# Patient Record
Sex: FEMALE | Race: WHITE | NOT HISPANIC OR LATINO | URBAN - METROPOLITAN AREA
[De-identification: names, ages, dates, MRNs, and addresses within clinical notes are randomized per-mention and may not be internally consistent; named-entity substitution may affect disease eponyms.]

---

## 2017-08-21 ENCOUNTER — OUTPATIENT (OUTPATIENT)
Dept: OUTPATIENT SERVICES | Facility: HOSPITAL | Age: 52
LOS: 1 days | End: 2017-08-21
Payer: COMMERCIAL

## 2017-08-21 ENCOUNTER — TRANSCRIPTION ENCOUNTER (OUTPATIENT)
Age: 52
End: 2017-08-21

## 2017-08-21 DIAGNOSIS — Z12.11 ENCOUNTER FOR SCREENING FOR MALIGNANT NEOPLASM OF COLON: ICD-10-CM

## 2017-08-21 PROCEDURE — G0121: CPT

## 2018-10-01 ENCOUNTER — EMERGENCY (EMERGENCY)
Facility: HOSPITAL | Age: 53
LOS: 1 days | Discharge: HOME | End: 2018-10-01
Attending: STUDENT IN AN ORGANIZED HEALTH CARE EDUCATION/TRAINING PROGRAM
Payer: COMMERCIAL

## 2018-10-01 VITALS — HEIGHT: 61 IN | WEIGHT: 149.91 LBS

## 2018-10-01 DIAGNOSIS — Z98.891 HISTORY OF UTERINE SCAR FROM PREVIOUS SURGERY: Chronic | ICD-10-CM

## 2018-10-01 LAB
ALBUMIN SERPL ELPH-MCNC: 4.4 G/DL — SIGNIFICANT CHANGE UP (ref 3.3–5.2)
ALP SERPL-CCNC: 49 U/L — SIGNIFICANT CHANGE UP (ref 40–120)
ALT FLD-CCNC: 22 U/L — SIGNIFICANT CHANGE UP
ANION GAP SERPL CALC-SCNC: 14 MMOL/L — SIGNIFICANT CHANGE UP (ref 5–17)
AST SERPL-CCNC: 25 U/L — SIGNIFICANT CHANGE UP
BILIRUB SERPL-MCNC: 0.6 MG/DL — SIGNIFICANT CHANGE UP (ref 0.4–2)
BUN SERPL-MCNC: 18 MG/DL — SIGNIFICANT CHANGE UP (ref 8–20)
CALCIUM SERPL-MCNC: 9.6 MG/DL — SIGNIFICANT CHANGE UP (ref 8.6–10.2)
CHLORIDE SERPL-SCNC: 103 MMOL/L — SIGNIFICANT CHANGE UP (ref 98–107)
CO2 SERPL-SCNC: 23 MMOL/L — SIGNIFICANT CHANGE UP (ref 22–29)
CREAT SERPL-MCNC: 0.74 MG/DL — SIGNIFICANT CHANGE UP (ref 0.5–1.3)
GLUCOSE SERPL-MCNC: 95 MG/DL — SIGNIFICANT CHANGE UP (ref 70–115)
HCT VFR BLD CALC: 42.2 % — SIGNIFICANT CHANGE UP (ref 37–47)
HGB BLD-MCNC: 13.4 G/DL — SIGNIFICANT CHANGE UP (ref 12–16)
MAGNESIUM SERPL-MCNC: 2.1 MG/DL — SIGNIFICANT CHANGE UP (ref 1.6–2.6)
MCHC RBC-ENTMCNC: 28.2 PG — SIGNIFICANT CHANGE UP (ref 27–31)
MCHC RBC-ENTMCNC: 31.8 G/DL — LOW (ref 32–36)
MCV RBC AUTO: 88.8 FL — SIGNIFICANT CHANGE UP (ref 81–99)
NT-PROBNP SERPL-SCNC: 16 PG/ML — SIGNIFICANT CHANGE UP (ref 0–300)
PLATELET # BLD AUTO: 267 K/UL — SIGNIFICANT CHANGE UP (ref 150–400)
POTASSIUM SERPL-MCNC: 3.9 MMOL/L — SIGNIFICANT CHANGE UP (ref 3.5–5.3)
POTASSIUM SERPL-SCNC: 3.9 MMOL/L — SIGNIFICANT CHANGE UP (ref 3.5–5.3)
PROT SERPL-MCNC: 7.5 G/DL — SIGNIFICANT CHANGE UP (ref 6.6–8.7)
RBC # BLD: 4.75 M/UL — SIGNIFICANT CHANGE UP (ref 4.4–5.2)
RBC # FLD: 13 % — SIGNIFICANT CHANGE UP (ref 11–15.6)
SODIUM SERPL-SCNC: 140 MMOL/L — SIGNIFICANT CHANGE UP (ref 135–145)
TROPONIN T SERPL-MCNC: <0.01 NG/ML — SIGNIFICANT CHANGE UP (ref 0–0.06)
WBC # BLD: 7.9 K/UL — SIGNIFICANT CHANGE UP (ref 4.8–10.8)
WBC # FLD AUTO: 7.9 K/UL — SIGNIFICANT CHANGE UP (ref 4.8–10.8)

## 2018-10-01 PROCEDURE — 99284 EMERGENCY DEPT VISIT MOD MDM: CPT

## 2018-10-01 PROCEDURE — 71046 X-RAY EXAM CHEST 2 VIEWS: CPT | Mod: 26

## 2018-10-01 PROCEDURE — 93010 ELECTROCARDIOGRAM REPORT: CPT

## 2018-10-01 RX ORDER — LOSARTAN POTASSIUM 100 MG/1
1 TABLET, FILM COATED ORAL
Qty: 0 | Refills: 0 | COMMUNITY

## 2018-10-01 NOTE — ED STATDOCS - PROGRESS NOTE DETAILS
54 y/o F presents to ED c/o central chest pain onset 1 week ago after working out at the gym, which started to radiate to the back today. States "my chest feels heavy and I feel like I have a rock in the center". In addition to the chest pain, has been having neck pain and jaw pain intermittently. Denies shortness of breath, abdominal pain, nausea, vomiting, diarrhea, diaphoresis or cough. States she has been evaluated by cardiologist in the past due to strong family hx of early cardiac death (open heart surgery at age 29 on mother's side, MI before age 50).   Cardiologist: Dr. Saroj Garcia (last stress 2 years ago)    Due to patient's current complaints and strong family hx of early cardiac death, will send to main ED for further evaluation and cardiac monitoring.

## 2018-10-01 NOTE — ED PROVIDER NOTE - MEDICAL DECISION MAKING DETAILS
Female presenting with 1 week of constant, CP described as heaviness radiating to her back. CP worse with exacerbation. Given pt's hx of HTN and family hx of CAD < 55 years old, plan to obtain CT scan, blood work, labs, EKG, cardiac enzymes and re-eval.

## 2018-10-01 NOTE — ED ADULT NURSE NOTE - OBJECTIVE STATEMENT
assumed pt care at this time. 52yo female c/o chest pressure x1 week that radiates to back. pt states she has no pain but an uncomfortable feeling "like a rock sitting on her chest" and went to [facility] where they told her to come to ed. pt c/o slight non-productive cough that startes today. pt denies any numbness/tingling radiating of pain/discomfort. no change in vision, no difficulty walking, no dysuria, change in bowel or bladder problems. no sob, no resp distress. pt states she had a similar feeling a year ago but was unable to complete stress test. pt on cardiac monitor, labs drawn iv placed, pending urgent ct. bp in both arms completed. pt currently has menstrual period

## 2018-10-01 NOTE — ED ADULT NURSE NOTE - NSIMPLEMENTINTERV_GEN_ALL_ED
Implemented All Universal Safety Interventions:  [place] to call system. Call bell, personal items and telephone within reach. Instruct patient to call for assistance. Room bathroom lighting operational. Non-slip footwear when patient is off stretcher. Physically safe environment: no spills, clutter or unnecessary equipment. Stretcher in lowest position, wheels locked, appropriate side rails in place.

## 2018-10-01 NOTE — ED ADULT NURSE NOTE - CHPI ED NUR SYMPTOMS NEG
no dizziness/no shortness of breath/no congestion/no diaphoresis/no fever/no vomiting/no nausea/no chest pain/no chills/no syncope

## 2018-10-01 NOTE — ED ADULT TRIAGE NOTE - CHIEF COMPLAINT QUOTE
"I am having chest pain, I feel it in the center of my chest, its like a consistent pain and radiates to the center of my back. I was sent here from [facility]" Pt A & XO4, denies SOB

## 2018-10-01 NOTE — ED PROVIDER NOTE - OBJECTIVE STATEMENT
52 y/o female, non-smoker with a hx of HTN presents to the ED c/o CP that onset 1 week ago. Pt notes that she has had constant CP that is described as a heaviness which radiates to her back. He has been excreting herself more than usual, including recently starting exercising. Physical exertion makes the pain worse. No relieving factors. Took Advil with minimal relieve. Nausea associated with sx. Last stress test was 2.5 years ago, last Echocardiogram was 2 years ago by Dr. Saroj Garcia's group. She states to  [facility], where she got a C-xray.  Notes family hx of CAD <55 years old. Denies N/V/D, fever, chills, SOB, difficulty breathing, HA, diaphoresis, leg swelling, blurry vision or abd pain. No further complaints at this time.

## 2018-10-02 VITALS
SYSTOLIC BLOOD PRESSURE: 155 MMHG | OXYGEN SATURATION: 100 % | RESPIRATION RATE: 16 BRPM | DIASTOLIC BLOOD PRESSURE: 70 MMHG | HEART RATE: 55 BPM

## 2018-10-02 LAB — HCG UR QL: NEGATIVE — SIGNIFICANT CHANGE UP

## 2018-10-02 PROCEDURE — 85027 COMPLETE CBC AUTOMATED: CPT

## 2018-10-02 PROCEDURE — 84702 CHORIONIC GONADOTROPIN TEST: CPT

## 2018-10-02 PROCEDURE — 81025 URINE PREGNANCY TEST: CPT

## 2018-10-02 PROCEDURE — 93005 ELECTROCARDIOGRAM TRACING: CPT

## 2018-10-02 PROCEDURE — 83880 ASSAY OF NATRIURETIC PEPTIDE: CPT

## 2018-10-02 PROCEDURE — 83735 ASSAY OF MAGNESIUM: CPT

## 2018-10-02 PROCEDURE — 71275 CT ANGIOGRAPHY CHEST: CPT

## 2018-10-02 PROCEDURE — 71275 CT ANGIOGRAPHY CHEST: CPT | Mod: 26

## 2018-10-02 PROCEDURE — 71046 X-RAY EXAM CHEST 2 VIEWS: CPT

## 2018-10-02 PROCEDURE — 84484 ASSAY OF TROPONIN QUANT: CPT

## 2018-10-02 PROCEDURE — 36415 COLL VENOUS BLD VENIPUNCTURE: CPT

## 2018-10-02 PROCEDURE — 74174 CTA ABD&PLVS W/CONTRAST: CPT

## 2018-10-02 PROCEDURE — 74174 CTA ABD&PLVS W/CONTRAST: CPT | Mod: 26

## 2018-10-02 PROCEDURE — 80053 COMPREHEN METABOLIC PANEL: CPT

## 2018-10-02 PROCEDURE — 99284 EMERGENCY DEPT VISIT MOD MDM: CPT | Mod: 25

## 2018-10-02 NOTE — ED ADULT NURSE REASSESSMENT NOTE - NS ED NURSE REASSESS COMMENT FT1
pt a&ox3 denies any pain/discomfort. states symptoms starting to resolve. pending hcg for ct. pt updated on plan of care,verbalizes understanding

## 2020-02-01 ENCOUNTER — EMERGENCY (EMERGENCY)
Facility: HOSPITAL | Age: 55
LOS: 1 days | Discharge: HOME | End: 2020-02-01
Attending: EMERGENCY MEDICINE
Payer: COMMERCIAL

## 2020-02-01 VITALS
OXYGEN SATURATION: 97 % | RESPIRATION RATE: 20 BRPM | HEART RATE: 90 BPM | WEIGHT: 139.99 LBS | TEMPERATURE: 99 F | DIASTOLIC BLOOD PRESSURE: 87 MMHG | SYSTOLIC BLOOD PRESSURE: 157 MMHG | HEIGHT: 61 IN

## 2020-02-01 VITALS
RESPIRATION RATE: 18 BRPM | TEMPERATURE: 98 F | HEART RATE: 60 BPM | SYSTOLIC BLOOD PRESSURE: 110 MMHG | DIASTOLIC BLOOD PRESSURE: 71 MMHG

## 2020-02-01 DIAGNOSIS — Z98.891 HISTORY OF UTERINE SCAR FROM PREVIOUS SURGERY: Chronic | ICD-10-CM

## 2020-02-01 PROBLEM — I10 ESSENTIAL (PRIMARY) HYPERTENSION: Chronic | Status: ACTIVE | Noted: 2018-10-08

## 2020-02-01 LAB
ANION GAP SERPL CALC-SCNC: 16 MMOL/L — SIGNIFICANT CHANGE UP (ref 5–17)
APPEARANCE UR: CLEAR — SIGNIFICANT CHANGE UP
BACTERIA # UR AUTO: ABNORMAL
BILIRUB UR-MCNC: NEGATIVE — SIGNIFICANT CHANGE UP
BUN SERPL-MCNC: 32 MG/DL — HIGH (ref 8–20)
CALCIUM SERPL-MCNC: 9.5 MG/DL — SIGNIFICANT CHANGE UP (ref 8.6–10.2)
CHLORIDE SERPL-SCNC: 103 MMOL/L — SIGNIFICANT CHANGE UP (ref 98–107)
CO2 SERPL-SCNC: 21 MMOL/L — LOW (ref 22–29)
COLOR SPEC: YELLOW — SIGNIFICANT CHANGE UP
CREAT SERPL-MCNC: 1.46 MG/DL — HIGH (ref 0.5–1.3)
DIFF PNL FLD: ABNORMAL
EPI CELLS # UR: ABNORMAL
GLUCOSE SERPL-MCNC: 116 MG/DL — HIGH (ref 70–99)
GLUCOSE UR QL: NEGATIVE MG/DL — SIGNIFICANT CHANGE UP
HCG UR QL: NEGATIVE — SIGNIFICANT CHANGE UP
KETONES UR-MCNC: NEGATIVE — SIGNIFICANT CHANGE UP
LEUKOCYTE ESTERASE UR-ACNC: ABNORMAL
NITRITE UR-MCNC: NEGATIVE — SIGNIFICANT CHANGE UP
PH UR: 6 — SIGNIFICANT CHANGE UP (ref 5–8)
POTASSIUM SERPL-MCNC: 4.6 MMOL/L — SIGNIFICANT CHANGE UP (ref 3.5–5.3)
POTASSIUM SERPL-SCNC: 4.6 MMOL/L — SIGNIFICANT CHANGE UP (ref 3.5–5.3)
PROT UR-MCNC: NEGATIVE MG/DL — SIGNIFICANT CHANGE UP
RBC CASTS # UR COMP ASSIST: ABNORMAL /HPF (ref 0–4)
SODIUM SERPL-SCNC: 140 MMOL/L — SIGNIFICANT CHANGE UP (ref 135–145)
SP GR SPEC: 1 — LOW (ref 1.01–1.02)
UROBILINOGEN FLD QL: NEGATIVE MG/DL — SIGNIFICANT CHANGE UP
WBC UR QL: ABNORMAL

## 2020-02-01 PROCEDURE — 74176 CT ABD & PELVIS W/O CONTRAST: CPT | Mod: 26

## 2020-02-01 PROCEDURE — 99285 EMERGENCY DEPT VISIT HI MDM: CPT

## 2020-02-01 PROCEDURE — 36415 COLL VENOUS BLD VENIPUNCTURE: CPT

## 2020-02-01 PROCEDURE — 99284 EMERGENCY DEPT VISIT MOD MDM: CPT

## 2020-02-01 PROCEDURE — 87086 URINE CULTURE/COLONY COUNT: CPT

## 2020-02-01 PROCEDURE — 81025 URINE PREGNANCY TEST: CPT

## 2020-02-01 PROCEDURE — 81001 URINALYSIS AUTO W/SCOPE: CPT

## 2020-02-01 PROCEDURE — 80048 BASIC METABOLIC PNL TOTAL CA: CPT

## 2020-02-01 PROCEDURE — 74176 CT ABD & PELVIS W/O CONTRAST: CPT

## 2020-02-01 RX ORDER — AZTREONAM 2 G
1 VIAL (EA) INJECTION
Qty: 28 | Refills: 0
Start: 2020-02-01 | End: 2020-02-14

## 2020-02-01 RX ORDER — TAMSULOSIN HYDROCHLORIDE 0.4 MG/1
1 CAPSULE ORAL
Qty: 15 | Refills: 0
Start: 2020-02-01 | End: 2020-02-15

## 2020-02-01 RX ORDER — SODIUM CHLORIDE 9 MG/ML
1000 INJECTION INTRAMUSCULAR; INTRAVENOUS; SUBCUTANEOUS ONCE
Refills: 0 | Status: COMPLETED | OUTPATIENT
Start: 2020-02-01 | End: 2020-02-01

## 2020-02-01 RX ORDER — ACETAMINOPHEN 500 MG
650 TABLET ORAL ONCE
Refills: 0 | Status: COMPLETED | OUTPATIENT
Start: 2020-02-01 | End: 2020-02-01

## 2020-02-01 RX ORDER — CYCLOBENZAPRINE HYDROCHLORIDE 10 MG/1
10 TABLET, FILM COATED ORAL ONCE
Refills: 0 | Status: COMPLETED | OUTPATIENT
Start: 2020-02-01 | End: 2020-02-01

## 2020-02-01 RX ORDER — TAMSULOSIN HYDROCHLORIDE 0.4 MG/1
0.4 CAPSULE ORAL ONCE
Refills: 0 | Status: COMPLETED | OUTPATIENT
Start: 2020-02-01 | End: 2020-02-01

## 2020-02-01 RX ORDER — LIDOCAINE 4 G/100G
1 CREAM TOPICAL ONCE
Refills: 0 | Status: COMPLETED | OUTPATIENT
Start: 2020-02-01 | End: 2020-02-01

## 2020-02-01 RX ADMIN — TAMSULOSIN HYDROCHLORIDE 0.4 MILLIGRAM(S): 0.4 CAPSULE ORAL at 11:59

## 2020-02-01 RX ADMIN — Medication 650 MILLIGRAM(S): at 06:28

## 2020-02-01 RX ADMIN — SODIUM CHLORIDE 1000 MILLILITER(S): 9 INJECTION INTRAMUSCULAR; INTRAVENOUS; SUBCUTANEOUS at 10:35

## 2020-02-01 RX ADMIN — LIDOCAINE 1 PATCH: 4 CREAM TOPICAL at 06:29

## 2020-02-01 RX ADMIN — Medication 1 TABLET(S): at 08:05

## 2020-02-01 RX ADMIN — CYCLOBENZAPRINE HYDROCHLORIDE 10 MILLIGRAM(S): 10 TABLET, FILM COATED ORAL at 06:28

## 2020-02-01 RX ADMIN — SODIUM CHLORIDE 1000 MILLILITER(S): 9 INJECTION INTRAMUSCULAR; INTRAVENOUS; SUBCUTANEOUS at 09:45

## 2020-02-01 NOTE — ED ADULT NURSE NOTE - OBJECTIVE STATEMENT
Pt c/o of sudden excruciating back pain that radiates to the flanks that started last night. Pt states she has been dealing with chronic back pain for about 1-2 years. Pt states the pain wok up last night and was as bad as she has ever felt.

## 2020-02-01 NOTE — ED PROVIDER NOTE - ATTENDING CONTRIBUTION TO CARE
Episodic low back pain, possibly related to an old injury but does localizes somewhat more to the right. Better now after taking meloxicam but not fully resolved. Has no urinary symptoms but UA with leuk esterase and WBCs. Will treat with antibiotics for cystitis, get CT to r/o kidney stone. Do not suspect pyelo as pt has no fever or systemic symptoms.

## 2020-02-01 NOTE — ED PROVIDER NOTE - CLINICAL SUMMARY MEDICAL DECISION MAKING FREE TEXT BOX
54f with acute onset of lower back pain radiating to b/l flanks. + blood on UA with moderate leuks. Will order CT renal stone, treat pain, and reassess

## 2020-02-01 NOTE — ED PROVIDER NOTE - CARE PLAN
Principal Discharge DX:	Urinary tract infection without hematuria, site unspecified  Secondary Diagnosis:	Kidney stone on right side

## 2020-02-01 NOTE — ED PROVIDER NOTE - PROGRESS NOTE DETAILS
PA NOTE: + UA, will treat with course of bactrim. Pending renal CT hunt. AJM: case reviewed with  dr orozco who agrees with IVF. reccs dc home with abx and tamsulosin. will follow in office this week. pt update and agrees with plan. All results discussed with the patient, and a copy of results has been provided. Patient is comfortable with dc plan for home. Opportunity for questions was provided and all questions have been adressed.

## 2020-02-01 NOTE — ED PROVIDER NOTE - NSFOLLOWUPINSTRUCTIONS_ED_ALL_ED_FT
Urinary Tract Infection, Adult  ImageA urinary tract infection (UTI) is an infection of any part of the urinary tract, which includes the kidneys, ureters, bladder, and urethra. These organs make, store, and get rid of urine in the body. UTI can be a bladder infection (cystitis) or kidney infection (pyelonephritis).    What are the causes?  This infection may be caused by fungi, viruses, or bacteria. Bacteria are the most common cause of UTIs. This condition can also be caused by repeated incomplete emptying of the bladder during urination.    What increases the risk?  This condition is more likely to develop if:    You ignore your need to urinate or hold urine for long periods of time.  You do not empty your bladder completely during urination.  You wipe back to front after urinating or having a bowel movement, if you are female.  You are uncircumcised, if you are male.  You are constipated.  You have a urinary catheter that stays in place (indwelling).  You have a weak defense (immune) system.  You have a medical condition that affects your bowels, kidneys, or bladder.  You have diabetes.  You take antibiotic medicines frequently or for long periods of time, and the antibiotics no longer work well against certain types of infections (antibiotic resistance).  You take medicines that irritate your urinary tract.  You are exposed to chemicals that irritate your urinary tract.  You are female.    What are the signs or symptoms?  Symptoms of this condition include:    Fever.  Frequent urination or passing small amounts of urine frequently.  Needing to urinate urgently.  Pain or burning with urination.  Urine that smells bad or unusual.  Cloudy urine.  Pain in the lower abdomen or back.  Trouble urinating.  Blood in the urine.  Vomiting or being less hungry than normal.  Diarrhea or abdominal pain.  Vaginal discharge, if you are female.    How is this diagnosed?  This condition is diagnosed with a medical history and physical exam. You will also need to provide a urine sample to test your urine. Other tests may be done, including:    Blood tests.  Sexually transmitted disease (STD) testing.    If you have had more than one UTI, a cystoscopy or imaging studies may be done to determine the cause of the infections.    How is this treated?  Treatment for this condition often includes a combination of two or more of the following:    Antibiotic medicine.  Other medicines to treat less common causes of UTI.  Over-the-counter medicines to treat pain.  Drinking enough water to stay hydrated.    Follow these instructions at home:  Take over-the-counter and prescription medicines only as told by your health care provider.  If you were prescribed an antibiotic, take it as told by your health care provider. Do not stop taking the antibiotic even if you start to feel better.  Avoid alcohol, caffeine, tea, and carbonated beverages. They can irritate your bladder.  Drink enough fluid to keep your urine clear or pale yellow.  Keep all follow-up visits as told by your health care provider. This is important.  ImageMake sure to:    Empty your bladder often and completely. Do not hold urine for long periods of time.  Empty your bladder before and after sex.  Wipe from front to back after a bowel movement if you are female. Use each tissue one time when you wipe.    Contact a health care provider if:  You have back pain.  You have a fever.  You feel nauseous or vomit.  Your symptoms do not get better after 3 days.  Your symptoms go away and then return.  Get help right away if:  You have severe back pain or lower abdominal pain.  You are vomiting and cannot keep down any medicines or water.  This information is not intended to replace advice given to you by your health care provider. Make sure you discuss any questions you have with your health care provider.     Kidney Stones    WHAT YOU NEED TO KNOW:    Kidney stones form in the urinary system when the water and waste in your urine are out of balance. When this happens, certain types of waste crystals separate from the urine. The crystals build up and form kidney stones. You may have more than one kidney stone.     DISCHARGE INSTRUCTIONS:    Return to the emergency department if:     You have vomiting that is not relieved by medicine.        Contact your healthcare provider if:     You have a fever.       You have trouble passing urine.      You see blood in your urine.      You have severe pain.      You have any questions or concerns about your condition or care.    Medicines:     NSAIDs, such as ibuprofen, help decrease swelling, pain, and fever. This medicine is available with or without a doctor's order. NSAIDs can cause stomach bleeding or kidney problems in certain people. If you take blood thinner medicine, always ask your healthcare provider if NSAIDs are safe for you. Always read the medicine label and follow directions.      Prescription pain medicine may be given. Ask your healthcare provider how to take this medicine safely. Some prescription pain medicines contain acetaminophen. Do not take other medicines that contain acetaminophen without talking to your healthcare provider. Too much acetaminophen may cause liver damage. Prescription pain medicine may cause constipation. Ask your healthcare provider how to prevent or treat constipation.       Medicines to balance your electrolytes may be needed.       Take your medicine as directed. Contact your healthcare provider if you think your medicine is not helping or if you have side effects. Tell him or her if you are allergic to any medicine. Keep a list of the medicines, vitamins, and herbs you take. Include the amounts, and when and why you take them. Bring the list or the pill bottles to follow-up visits. Carry your medicine list with you in case of an emergency.    Follow up with your healthcare provider as directed: You may need to return for more tests. Write down your questions so you remember to ask them during your visits.    What you can do to manage kidney stones:     Drink more liquids. Your healthcare provider may tell you to drink at least 8 to 12 (eight-ounce) cups of liquids each day. This helps flush out the kidney stones when you urinate. Water is the best liquid to drink.      Strain your urine every time you go to the bathroom. Urinate through a strainer or a piece of thin cloth to catch the stones. Take the stones to your healthcare provider so they can be sent to the lab for tests. This will help your healthcare providers plan the best treatment for you.Look for Stones in the Filter           Eat a variety of healthy foods. Healthy foods include fruits, vegetables, whole-grain breads, low-fat dairy products, beans, and fish. You may need to limit how much sodium (salt) or protein you eat. Ask for information about the best foods for you.      Stay active. Your stones may pass more easily if you stay active. Exercise can also help you manage your weight. Ask about the best activities for you.    After you pass the kidney stones: Your healthcare provider may order a 24-hour urine test. Results from a 24-hour urine test will help your healthcare provider plan ways to prevent more stones from forming. Your healthcare provider will give you more instructions.

## 2020-02-01 NOTE — ED PROVIDER NOTE - OBJECTIVE STATEMENT
Pt is a 55 y/o f, no PMH, presents to ED c/o lower back pain x 1 day. Pt states she woke up from sleeping this evening with a sharp pain in her lower back radiating to her b/l flanks. Pt states the pain is exacerbated with lying flat. Pt took meloxicam PTA with minimal relief of symptoms. Pt has had no episodes of vomiting since onset of symptoms. Denies urinary frequency, hematuria, belly pain, chest pain, SOB, cough, fevers, urinary incontinence / retention.

## 2020-02-01 NOTE — ED ADULT NURSE NOTE - CAS EDN INTEG ASSESS
Detail Level: Detailed Quality 137: Melanoma: Continuity Of Care - Recall System: Patient information entered into a recall system that includes: target date for the next exam specified AND a process to follow up with patients regarding missed or unscheduled appointments Quality 224: Stage 0-Iic Melanoma: Overutilization Of Imaging Studies For Only Stage 0-Iic Melanoma: None of the following diagnostic imaging studies ordered: chest X-ray, CT, Ultrasound, MRI, PET, or nuclear medicine scans (ML) Detail Level: Generalized Include Location In Plan?: No Detail Level: Simple WDL

## 2020-02-01 NOTE — ED PROVIDER NOTE - CARE PROVIDER_API CALL
Aravind Whatley)  Urology  [address], [address]  [place] [address]  Phone: [phone]  Fax: [phone]  Established Patient  Follow Up Time:

## 2020-02-01 NOTE — ED PROVIDER NOTE - PATIENT PORTAL LINK FT
You can access the FollowMyHealth Patient Portal offered by [facility] by registering at the following website: [email or web address]. By joining [employer] [facility]’s FollowMyHealth portal, you will also be able to view your health information using other applications (apps) compatible with our system.

## 2020-02-02 LAB
CULTURE RESULTS: SIGNIFICANT CHANGE UP
SPECIMEN SOURCE: SIGNIFICANT CHANGE UP

## 2020-02-05 ENCOUNTER — TRANSCRIPTION ENCOUNTER (OUTPATIENT)
Age: 55
End: 2020-02-05

## 2020-02-10 ENCOUNTER — APPOINTMENT (OUTPATIENT)
Dept: UROLOGY | Facility: CLINIC | Age: 55
End: 2020-02-10
Payer: COMMERCIAL

## 2020-02-10 VITALS
HEART RATE: 63 BPM | DIASTOLIC BLOOD PRESSURE: 70 MMHG | SYSTOLIC BLOOD PRESSURE: 132 MMHG | RESPIRATION RATE: 15 BRPM | WEIGHT: 130 LBS | HEIGHT: 61 IN | BODY MASS INDEX: 24.55 KG/M2

## 2020-02-10 DIAGNOSIS — Z78.9 OTHER SPECIFIED HEALTH STATUS: ICD-10-CM

## 2020-02-10 DIAGNOSIS — Z82.49 FAMILY HISTORY OF ISCHEMIC HEART DISEASE AND OTHER DISEASES OF THE CIRCULATORY SYSTEM: ICD-10-CM

## 2020-02-10 DIAGNOSIS — N17.0 ACUTE KIDNEY FAILURE WITH TUBULAR NECROSIS: ICD-10-CM

## 2020-02-10 DIAGNOSIS — Z83.3 FAMILY HISTORY OF DIABETES MELLITUS: ICD-10-CM

## 2020-02-10 LAB
BILIRUB UR QL STRIP: NORMAL
CLARITY UR: CLEAR
COLLECTION METHOD: NORMAL
GLUCOSE UR-MCNC: NORMAL
HCG UR QL: 0.2 EU/DL
HGB UR QL STRIP.AUTO: NORMAL
KETONES UR-MCNC: NORMAL
LEUKOCYTE ESTERASE UR QL STRIP: ABNORMAL
NITRITE UR QL STRIP: NORMAL
PH UR STRIP: 5.5
PROT UR STRIP-MCNC: NORMAL
SP GR UR STRIP: 1.01

## 2020-02-10 PROCEDURE — 81003 URINALYSIS AUTO W/O SCOPE: CPT | Mod: QW

## 2020-02-10 PROCEDURE — 99203 OFFICE O/P NEW LOW 30 MIN: CPT | Mod: 25

## 2020-02-10 RX ORDER — LOSARTAN POTASSIUM 100 MG/1
100 TABLET, FILM COATED ORAL DAILY
Qty: 30 | Refills: 0 | Status: ACTIVE | COMMUNITY
Start: 2020-02-10

## 2020-02-10 NOTE — REVIEW OF SYSTEMS
[see HPI] : see HPI [Negative] : Heme/Lymph [Chills] : chills [Feeling Tired] : feeling tired [Constipation] : constipation [Heartburn] : heartburn [Presently in menopause ___] : presently in menopause [unfilled] [Told you have blood in urine on a urine test] : told blood was present in a urine test [Wake up at night to urinate  How many times?  ___] : wakes up to urinate [unfilled] times during the night [Strong urge to urinate] : strong urge to urinate [Strain or push to urinate] : strain or push to urinate [Leakage of urine with straining, coughing, laughing] : leakage of urine with straining, coughing, laughing [Feelings Of Weakness] : feelings of weakness [Hot Flashes] : hot flashes

## 2020-02-10 NOTE — LETTER BODY
[Dear  ___] : Dear  [unfilled], [Courtesy Letter:] : I had the pleasure of seeing your patient, [unfilled], in my office today. [Please see my note below.] : Please see my note below. [Sincerely,] : Sincerely, [FreeTextEntry3] : Ed\par \par Aravind Whatley MD\par [facility]\par  of Urology\par [employer] [facility]\par

## 2020-02-10 NOTE — HISTORY OF PRESENT ILLNESS
[FreeTextEntry1] : patient noted severe right flank pain and right CVA pain. She was nauseated and awakened from sleep. no dsyuria. no gross hematuria, had microhematuria at ER.

## 2020-02-10 NOTE — ASSESSMENT
[FreeTextEntry1] : impression:\par \par ureteral stone\par acute kidney injury\par \par Plan":\par \par follow up in 1 week with BMP and KUB

## 2020-02-27 ENCOUNTER — APPOINTMENT (OUTPATIENT)
Dept: UROLOGY | Facility: CLINIC | Age: 55
End: 2020-02-27
Payer: COMMERCIAL

## 2020-02-27 VITALS — HEART RATE: 60 BPM | SYSTOLIC BLOOD PRESSURE: 138 MMHG | DIASTOLIC BLOOD PRESSURE: 79 MMHG

## 2020-02-27 DIAGNOSIS — N20.1 CALCULUS OF URETER: ICD-10-CM

## 2020-02-27 PROCEDURE — 99213 OFFICE O/P EST LOW 20 MIN: CPT

## 2020-02-27 RX ORDER — TAMSULOSIN HYDROCHLORIDE 0.4 MG/1
0.4 CAPSULE ORAL
Qty: 30 | Refills: 3 | Status: DISCONTINUED | COMMUNITY
Start: 2020-02-10 | End: 2020-02-27

## 2020-02-27 RX ORDER — SULFAMETHOXAZOLE AND TRIMETHOPRIM 800; 160 MG/1; MG/1
800-160 TABLET ORAL TWICE DAILY
Qty: 10 | Refills: 0 | Status: DISCONTINUED | COMMUNITY
Start: 2020-02-10 | End: 2020-02-27

## 2020-02-27 NOTE — LETTER BODY
[Courtesy Letter:] : I had the pleasure of seeing your patient, [unfilled], in my office today. [Dear  ___] : Dear  [unfilled], [Please see my note below.] : Please see my note below. [Sincerely,] : Sincerely, [FreeTextEntry3] : Ed\par \par Aravind Whatley MD\par [facility]\par  of Urology\par [employer] [facility]\par

## 2020-02-27 NOTE — HISTORY OF PRESENT ILLNESS
[FreeTextEntry1] : Patient has been having some frequency for some time.  pain resolved. no dysuria or fevers or chills  Did not pass a stone. No previous stones documented\par

## 2020-02-27 NOTE — PHYSICAL EXAM
[General Appearance - Well Developed] : well developed [General Appearance - Well Nourished] : well nourished [Normal Appearance] : normal appearance [Costovertebral Angle Tenderness] : no ~M costovertebral angle tenderness [General Appearance - In No Acute Distress] : no acute distress [Well Groomed] : well groomed [Affect] : the affect was normal [Oriented To Time, Place, And Person] : oriented to person, place, and time [Mood] : the mood was normal [Not Anxious] : not anxious [Normal Station and Gait] : the gait and station were normal for the patient's age

## 2020-07-29 ENCOUNTER — APPOINTMENT (OUTPATIENT)
Dept: OBGYN | Facility: CLINIC | Age: 55
End: 2020-07-29
Payer: COMMERCIAL

## 2020-07-29 VITALS
RESPIRATION RATE: 16 BRPM | BODY MASS INDEX: 27.19 KG/M2 | TEMPERATURE: 98.3 F | DIASTOLIC BLOOD PRESSURE: 66 MMHG | SYSTOLIC BLOOD PRESSURE: 130 MMHG | WEIGHT: 144 LBS | HEIGHT: 61 IN

## 2020-07-29 DIAGNOSIS — Z01.419 ENCOUNTER FOR GYNECOLOGICAL EXAMINATION (GENERAL) (ROUTINE) W/OUT ABNORMAL FINDINGS: ICD-10-CM

## 2020-07-29 PROCEDURE — 99386 PREV VISIT NEW AGE 40-64: CPT

## 2020-07-29 NOTE — COUNSELING
[Breast Self Exam] : breast self exam [Nutrition] : nutrition [Vitamins/Supplements] : vitamins/supplements [Exercise] : exercise [Weight Management] : weight management [Menstrual Calendar] : menstrual calendar

## 2020-07-29 NOTE — PHYSICAL EXAM
[Awake] : awake [Alert] : alert [Soft] : soft [Oriented x3] : oriented to person, place, and time [Normal] : uterus [No Bleeding] : there was no active vaginal bleeding [Uterine Adnexae] : were not tender and not enlarged [Acute Distress] : no acute distress [Mass] : no breast mass [Nipple Discharge] : no nipple discharge [Tender] : no tenderness [Axillary LAD] : no axillary lymphadenopathy

## 2020-09-03 ENCOUNTER — APPOINTMENT (OUTPATIENT)
Dept: CARDIOLOGY | Facility: CLINIC | Age: 55
End: 2020-09-03
Payer: COMMERCIAL

## 2020-09-03 ENCOUNTER — NON-APPOINTMENT (OUTPATIENT)
Age: 55
End: 2020-09-03

## 2020-09-03 VITALS
OXYGEN SATURATION: 97 % | HEIGHT: 61 IN | HEART RATE: 84 BPM | DIASTOLIC BLOOD PRESSURE: 75 MMHG | WEIGHT: 141 LBS | RESPIRATION RATE: 14 BRPM | SYSTOLIC BLOOD PRESSURE: 112 MMHG | TEMPERATURE: 97.5 F | BODY MASS INDEX: 26.62 KG/M2

## 2020-09-03 VITALS — DIASTOLIC BLOOD PRESSURE: 70 MMHG | SYSTOLIC BLOOD PRESSURE: 102 MMHG

## 2020-09-03 DIAGNOSIS — K85.90 ACUTE PANCREATITIS WITHOUT NECROSIS OR INFECTION, UNSPECIFIED: ICD-10-CM

## 2020-09-03 DIAGNOSIS — Z82.5 FAMILY HISTORY OF ASTHMA AND OTHER CHRONIC LOWER RESPIRATORY DISEASES: ICD-10-CM

## 2020-09-03 DIAGNOSIS — Z78.9 OTHER SPECIFIED HEALTH STATUS: ICD-10-CM

## 2020-09-03 DIAGNOSIS — Z00.00 ENCOUNTER FOR GENERAL ADULT MEDICAL EXAMINATION W/OUT ABNORMAL FINDINGS: ICD-10-CM

## 2020-09-03 DIAGNOSIS — R94.31 ABNORMAL ELECTROCARDIOGRAM [ECG] [EKG]: ICD-10-CM

## 2020-09-03 PROCEDURE — 93000 ELECTROCARDIOGRAM COMPLETE: CPT

## 2020-09-03 PROCEDURE — 99204 OFFICE O/P NEW MOD 45 MIN: CPT | Mod: 25

## 2020-09-06 PROBLEM — Z00.00 ENCOUNTER FOR PREVENTIVE HEALTH EXAMINATION: Status: ACTIVE | Noted: 2017-08-11

## 2020-09-06 NOTE — REVIEW OF SYSTEMS
[Recent Weight Gain (___ Lbs)] : no recent weight gain [Feeling Fatigued] : not feeling fatigued [Recent Weight Loss (___ Lbs)] : no recent weight loss [Seeing Double (Diplopia)] : no diplopia [Eyeglasses] : currently wearing eyeglasses [Earache] : no earache [Sore Throat] : no sore throat [Discharge From The Ears] : no discharge from the ears [Sinus Pressure] : no sinus pressure [Chest  Pressure] : no chest pressure [Dyspnea on exertion] : not dyspnea during exertion [Leg Claudication] : no intermittent leg claudication [Palpitations] : no palpitations [Lower Ext Edema] : no extremity edema [Cough] : no cough [Wheezing] : no wheezing [Abdominal Pain] : abdominal pain [Heartburn] : no heartburn [Nausea] : no nausea [Abn Vaginal Bleeding] : no unexplained vaginal bleeding [Pelvic Pain] : no pelvic pain [Dysuria] : no dysuria [Dysphagia] : no dysphagia [Joint Swelling] : no joint swelling [Joint Pain] : no joint pain [Skin: A Rash] : no rash: [Joint Stiffness] : no joint stiffness [Dizziness] : no dizziness [Skin Lesions] : no skin lesions [Convulsions] : no convulsions [Confusion] : no confusion was observed [Memory Lapses Or Loss] : no memory lapses or loss [Anxiety] : no anxiety [Excessive Thirst] : no polydipsia [Easy Bleeding] : no tendency for easy bleeding [Easy Bruising] : no tendency for easy bruising

## 2020-09-06 NOTE — ASSESSMENT
[FreeTextEntry1] : 55-year-old female with hypertension diagnosed about 7 years ago who presents for cardiac evaluation due to calcification seen on her recent scans when she presented in February 2020 with kidney stone.  I reviewed the images she does have mild calcification of her iliacs.  She has no claudications.  Her pulses are normal.  She also had a CAT scan of her chest performed in 2018, there is no coronary artery calcification.\par Her blood pressure is to goal.  Continue with losartan.\par She has mild hyperlipidemia labs are not available for review.  Patient will send a copy for review.\par Patient exercises a few times a week without any cardiac symptoms.\par Advised patient to take vitamin K 2.\par Echocardiogram with hypertension and murmur heard on exam.\par Due to abnormal T waves which are nonspecific patient will undergo an exercise a stress test.\par History of pancreatitis recently discharged from [facility].  Labs are not available for review.  The reason for pancreatitis is unknown.  Patient will follow-up with GI.\par Follow-up with us in about 1 year or earlier if need be.

## 2020-09-06 NOTE — HISTORY OF PRESENT ILLNESS
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  \par She has a recent history of microhematuria and underwent CT renal stone hunt in 2020 was diagnosed with kidney stones.  Patient was found to have atherosclerotic changes of the aorta and iliacs.  She never smoked.\par She developed gestational DM with first child, but not with second. No eclampsia or pre-eclampsia. \par Mom  at 54 yo, from emphysema. \par Dad: alive and 90, CABG at age 70's. MI in his 40's. \par Brother with DM, twin sister with HTN, Older brother with PE at age 60 due to factor V Leiden.\par  She was tested for it and was negative. Another brother with DM who had V-fib, required stents. He is 59. 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 Denies any cp or sob.  She also has a history of pancreatitis which she cannot elaborate on.  She has seen GI and needs close follow-up.\par She does not consume excessive amount of dairy products and does not take any calcium for osteoporosis.\par

## 2020-09-06 NOTE — HISTORY OF PRESENT ILLNESS
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  \par She has a recent history of microhematuria and underwent CT renal stone hunt in 2020 was diagnosed with kidney stones.  Patient was found to have atherosclerotic changes of the aorta and iliacs.  She never smoked.\par She developed gestational DM with first child, but not with second. No eclampsia or pre-eclampsia. \par Mom  at 56 yo, from emphysema. \par Dad: alive and 90, CABG at age 70's. MI in his 40's. \par Brother with DM, twin sister with HTN, Older brother with PE at age 60 due to factor V Leiden.\par  She was tested for it and was negative. Another brother with DM who had V-fib, required stents. He is 59. 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 Denies any cp or sob.  She also has a history of pancreatitis which she cannot elaborate on.  She has seen GI and needs close follow-up.\par She does not consume excessive amount of dairy products and does not take any calcium for osteoporosis.\par

## 2020-09-06 NOTE — ASSESSMENT
[FreeTextEntry1] : 55-year-old female with hypertension diagnosed about 7 years ago who presents for cardiac evaluation due to calcification seen on her recent scans when she presented in February 2020 with kidney stone.  I reviewed the images she does have mild calcification of her iliacs.  She has no claudications.  Her pulses are normal.  She also had a CAT scan of her chest performed in 2018, there is no coronary artery calcification.\par Her blood pressure is to goal.  Continue with losartan.\par She has mild hyperlipidemia labs are not available for review.  Patient will send a copy for review.\par Patient exercises a few times a week without any cardiac symptoms.\par Advised patient to take vitamin K 2.\par Echocardiogram with hypertension and murmur heard on exam.\par Due to abnormal T waves which are nonspecific patient will undergo an exercise a stress test.\par History of pancreatitis recently discharged from [facility].  Labs are not available for review.  The reason for pancreatitis is unknown.  Patient will follow-up with GI.\par Follow-up with us in about 1 year or earlier if need be.

## 2020-09-06 NOTE — REVIEW OF SYSTEMS
[Recent Weight Gain (___ Lbs)] : no recent weight gain [Feeling Fatigued] : not feeling fatigued [Seeing Double (Diplopia)] : no diplopia [Eyeglasses] : currently wearing eyeglasses [Recent Weight Loss (___ Lbs)] : no recent weight loss [Earache] : no earache [Sore Throat] : no sore throat [Discharge From The Ears] : no discharge from the ears [Chest  Pressure] : no chest pressure [Sinus Pressure] : no sinus pressure [Dyspnea on exertion] : not dyspnea during exertion [Palpitations] : no palpitations [Leg Claudication] : no intermittent leg claudication [Lower Ext Edema] : no extremity edema [Cough] : no cough [Abdominal Pain] : abdominal pain [Wheezing] : no wheezing [Nausea] : no nausea [Heartburn] : no heartburn [Pelvic Pain] : no pelvic pain [Abn Vaginal Bleeding] : no unexplained vaginal bleeding [Dysphagia] : no dysphagia [Dysuria] : no dysuria [Joint Pain] : no joint pain [Joint Swelling] : no joint swelling [Skin: A Rash] : no rash: [Joint Stiffness] : no joint stiffness [Dizziness] : no dizziness [Convulsions] : no convulsions [Skin Lesions] : no skin lesions [Confusion] : no confusion was observed [Memory Lapses Or Loss] : no memory lapses or loss [Anxiety] : no anxiety [Excessive Thirst] : no polydipsia [Easy Bleeding] : no tendency for easy bleeding [Easy Bruising] : no tendency for easy bruising

## 2020-09-06 NOTE — PHYSICAL EXAM
[Heart Rate And Rhythm] : heart rate and rhythm were normal [Arterial Pulses Normal] : the arterial pulses were normal [Heart Sounds] : normal S1 and S2 [Edema] : no peripheral edema present [General Appearance - Well Developed] : well developed [General Appearance - Well Nourished] : well nourished [Well Groomed] : well groomed [Normal Appearance] : normal appearance [No Deformities] : no deformities [General Appearance - In No Acute Distress] : no acute distress [Normal Conjunctiva] : the conjunctiva exhibited no abnormalities [Eyelids - No Xanthelasma] : the eyelids demonstrated no xanthelasmas [Normal Oral Mucosa] : normal oral mucosa [No Oral Pallor] : no oral pallor [Normal Oropharynx] : normal oropharynx [No Oral Cyanosis] : no oral cyanosis [No Jugular Venous Anthony A Waves] : no jugular venous anthony A waves [Normal Jugular Venous A Waves Present] : normal jugular venous A waves present [Normal Jugular Venous V Waves Present] : normal jugular venous V waves present [FreeTextEntry1] : No bruit [Auscultation Breath Sounds / Voice Sounds] : lungs were clear to auscultation bilaterally [Respiration, Rhythm And Depth] : normal respiratory rhythm and effort [Exaggerated Use Of Accessory Muscles For Inspiration] : no accessory muscle use [Abdomen Soft] : soft [Abdomen Tenderness] : non-tender [Abdomen Mass (___ Cm)] : no abdominal mass palpated [Abnormal Walk] : normal gait [Gait - Sufficient For Exercise Testing] : the gait was sufficient for exercise testing [Nail Clubbing] : no clubbing of the fingernails [Cyanosis, Localized] : no localized cyanosis [Petechial Hemorrhages (___cm)] : no petechial hemorrhages [Skin Color & Pigmentation] : normal skin color and pigmentation [No Venous Stasis] : no venous stasis [] : no rash [Skin Lesions] : no skin lesions [No Xanthoma] : no  xanthoma was observed [No Skin Ulcers] : no skin ulcer [Oriented To Time, Place, And Person] : oriented to person, place, and time [Affect] : the affect was normal [Mood] : the mood was normal [No Anxiety] : not feeling anxious

## 2020-09-06 NOTE — PHYSICAL EXAM
[Heart Sounds] : normal S1 and S2 [Heart Rate And Rhythm] : heart rate and rhythm were normal [Arterial Pulses Normal] : the arterial pulses were normal [Edema] : no peripheral edema present [General Appearance - Well Developed] : well developed [Normal Appearance] : normal appearance [General Appearance - Well Nourished] : well nourished [Well Groomed] : well groomed [General Appearance - In No Acute Distress] : no acute distress [No Deformities] : no deformities [Normal Conjunctiva] : the conjunctiva exhibited no abnormalities [Eyelids - No Xanthelasma] : the eyelids demonstrated no xanthelasmas [No Oral Pallor] : no oral pallor [Normal Oral Mucosa] : normal oral mucosa [Normal Oropharynx] : normal oropharynx [No Oral Cyanosis] : no oral cyanosis [Normal Jugular Venous A Waves Present] : normal jugular venous A waves present [No Jugular Venous Anthony A Waves] : no jugular venous anthony A waves [Normal Jugular Venous V Waves Present] : normal jugular venous V waves present [FreeTextEntry1] : 2/6 lópez grimm [Auscultation Breath Sounds / Voice Sounds] : lungs were clear to auscultation bilaterally [Exaggerated Use Of Accessory Muscles For Inspiration] : no accessory muscle use [Respiration, Rhythm And Depth] : normal respiratory rhythm and effort [Abdomen Soft] : soft [Abdomen Tenderness] : non-tender [Abdomen Mass (___ Cm)] : no abdominal mass palpated [Abnormal Walk] : normal gait [Nail Clubbing] : no clubbing of the fingernails [Gait - Sufficient For Exercise Testing] : the gait was sufficient for exercise testing [Petechial Hemorrhages (___cm)] : no petechial hemorrhages [Cyanosis, Localized] : no localized cyanosis [] : no rash [Skin Color & Pigmentation] : normal skin color and pigmentation [No Venous Stasis] : no venous stasis [No Xanthoma] : no  xanthoma was observed [Skin Lesions] : no skin lesions [No Skin Ulcers] : no skin ulcer [Mood] : the mood was normal [Oriented To Time, Place, And Person] : oriented to person, place, and time [Affect] : the affect was normal [No Anxiety] : not feeling anxious

## 2020-10-09 ENCOUNTER — APPOINTMENT (OUTPATIENT)
Dept: CARDIOLOGY | Facility: CLINIC | Age: 55
End: 2020-10-09
Payer: COMMERCIAL

## 2020-10-09 PROCEDURE — 93306 TTE W/DOPPLER COMPLETE: CPT

## 2020-10-09 PROCEDURE — 93015 CV STRESS TEST SUPVJ I&R: CPT

## 2020-10-27 ENCOUNTER — TRANSCRIPTION ENCOUNTER (OUTPATIENT)
Age: 55
End: 2020-10-27

## 2021-04-20 ENCOUNTER — NON-APPOINTMENT (OUTPATIENT)
Age: 56
End: 2021-04-20

## 2021-05-20 ENCOUNTER — NON-APPOINTMENT (OUTPATIENT)
Age: 56
End: 2021-05-20

## 2021-05-20 ENCOUNTER — APPOINTMENT (OUTPATIENT)
Dept: CARDIOLOGY | Facility: CLINIC | Age: 56
End: 2021-05-20
Payer: COMMERCIAL

## 2021-05-20 VITALS
HEART RATE: 59 BPM | OXYGEN SATURATION: 100 % | BODY MASS INDEX: 25.11 KG/M2 | WEIGHT: 133 LBS | HEIGHT: 61 IN | DIASTOLIC BLOOD PRESSURE: 70 MMHG | TEMPERATURE: 99.2 F | SYSTOLIC BLOOD PRESSURE: 130 MMHG

## 2021-05-20 DIAGNOSIS — I70.0 ATHEROSCLEROSIS OF AORTA: ICD-10-CM

## 2021-05-20 DIAGNOSIS — Z01.810 ENCOUNTER FOR PREPROCEDURAL CARDIOVASCULAR EXAMINATION: ICD-10-CM

## 2021-05-20 DIAGNOSIS — I10 ESSENTIAL (PRIMARY) HYPERTENSION: ICD-10-CM

## 2021-05-20 PROCEDURE — 99214 OFFICE O/P EST MOD 30 MIN: CPT

## 2021-05-20 PROCEDURE — 93000 ELECTROCARDIOGRAM COMPLETE: CPT

## 2021-05-20 NOTE — REVIEW OF SYSTEMS
[Negative] : Heme/Lymph [Fever] : no fever [Chills] : no chills [Earache] : no earache [Discharge From Ears] : no discharge from the ears [Dyspnea on exertion] : not dyspnea during exertion [Palpitations] : no palpitations [Wheezing] : no wheezing [Cough] : no cough

## 2021-05-20 NOTE — ASSESSMENT
[FreeTextEntry1] : Very pleasant 56-year-old female who is here for cardiac evaluation prior to tooth extraction.\par Patient has a history of hypertension and her blood pressure is to goal at this time.  Continue with losartan.\par No evidence of coronary artery calcification on a CAT scan.\par Continue with exercise as before.\par Aortic valve regurgitation is mild and not change on exam.\par Patient is a stable from our standpoint throughout her teeth extracted under anesthesia.  There is no need for antibiotic prophylaxis in order to prevent endocarditis.\par As long as she is asymptomatic, she can see me in the office in about a year otherwise will call to make an appointment soon.

## 2021-05-20 NOTE — PHYSICAL EXAM
[Well Developed] : well developed [Well Nourished] : well nourished [Normal Conjunctiva] : normal conjunctiva [Normal S1, S2] : normal S1, S2 [Normal] : moves all extremities, no focal deficits, normal speech [de-identified] : 3 out of 6 diastolic murmur at the left sternal border

## 2021-05-20 NOTE — HISTORY OF PRESENT ILLNESS
[FreeTextEntry1] : Patient is here for cardiac evaluation prior to undergoing tooth extraction.  She was initially seen on September 3, 2020.\par \par 56-year-old female with history of hypertension who was found to have distal aortic and iliac calcification which was mild on a CAT scan.  She had no coronary artery calcification.\par \par She was sent for echocardiogram demonstrating normal LV systolic function with mild aortic valve regurgitation.\par Stress test from October 9, 2020 was negative for any symptoms or EKG ischemia.\par \par Salima is feeling well and denies having any chest pain or shortness of breath.  She is exercising regularly.  She has no palpitations, syncope or presyncope.\par \par EKG: NSR, normal axis and intervals, no st/t changes

## 2021-06-25 ENCOUNTER — TRANSCRIPTION ENCOUNTER (OUTPATIENT)
Age: 56
End: 2021-06-25

## 2021-10-06 PROBLEM — I10 ESSENTIAL HYPERTENSION: Status: ACTIVE | Noted: 2020-02-10

## 2022-02-28 ENCOUNTER — NON-APPOINTMENT (OUTPATIENT)
Age: 57
End: 2022-02-28

## 2022-03-09 ENCOUNTER — APPOINTMENT (OUTPATIENT)
Dept: UROLOGY | Facility: CLINIC | Age: 57
End: 2022-03-09
Payer: COMMERCIAL

## 2022-03-09 VITALS
HEART RATE: 57 BPM | SYSTOLIC BLOOD PRESSURE: 153 MMHG | BODY MASS INDEX: 25.11 KG/M2 | DIASTOLIC BLOOD PRESSURE: 83 MMHG | WEIGHT: 133 LBS | HEIGHT: 61 IN

## 2022-03-09 PROCEDURE — 99214 OFFICE O/P EST MOD 30 MIN: CPT

## 2022-03-09 NOTE — HISTORY OF PRESENT ILLNESS
[None] : None [FreeTextEntry1] : Had sonogram in December and was noted to have a kidney stone, prior to that she had some low back pain. With h/o kidney stones. Denies frequency, urgency, dysuria, hematuria and pain.

## 2022-03-09 NOTE — REASON FOR VISIT
One touch strips (checks 4 times a day) Script was eprescribed to pharmacy per Dr. Taylor for 6 months.     [Follow-up Visit ___] : a follow-up visit  for [unfilled]

## 2022-03-09 NOTE — ASSESSMENT
[FreeTextEntry1] : Kidney stone\par \par Records reviewed: Renal US 12/2021 6 mm right renal stone, no hydro\par Discussed metabolic workup, instructed on 24H urine. Form faxed to [employer]. Pt given hard copy\par Continue increased fluids, no excessive sodium\par RTO 4 weeks for results with KUB

## 2022-03-09 NOTE — LETTER BODY
[Dear  ___] : Dear  [unfilled], [Courtesy Letter:] : I had the pleasure of seeing your patient, [unfilled], in my office today. [Please see my note below.] : Please see my note below. [Sincerely,] : Sincerely, [FreeTextEntry3] : Ed\par \par Aravind Whatley MD\par [facility]\par  of Urology\par [employer] [facility]\par

## 2022-03-19 ENCOUNTER — APPOINTMENT (OUTPATIENT)
Dept: RADIOLOGY | Facility: CLINIC | Age: 57
End: 2022-03-19
Payer: COMMERCIAL

## 2022-03-19 ENCOUNTER — OUTPATIENT (OUTPATIENT)
Dept: OUTPATIENT SERVICES | Facility: HOSPITAL | Age: 57
LOS: 1 days | End: 2022-03-19
Payer: COMMERCIAL

## 2022-03-19 DIAGNOSIS — Z98.891 HISTORY OF UTERINE SCAR FROM PREVIOUS SURGERY: Chronic | ICD-10-CM

## 2022-03-19 DIAGNOSIS — N20.0 CALCULUS OF KIDNEY: ICD-10-CM

## 2022-03-19 PROCEDURE — 74018 RADEX ABDOMEN 1 VIEW: CPT

## 2022-03-19 PROCEDURE — 74018 RADEX ABDOMEN 1 VIEW: CPT | Mod: 26

## 2022-04-06 ENCOUNTER — APPOINTMENT (OUTPATIENT)
Dept: UROLOGY | Facility: CLINIC | Age: 57
End: 2022-04-06
Payer: COMMERCIAL

## 2022-04-06 DIAGNOSIS — N20.0 CALCULUS OF KIDNEY: ICD-10-CM

## 2022-04-06 DIAGNOSIS — R82.991 HYPOCITRATURIA: ICD-10-CM

## 2022-04-06 PROCEDURE — 99214 OFFICE O/P EST MOD 30 MIN: CPT

## 2022-04-06 NOTE — ASSESSMENT
[FreeTextEntry1] : Impression:\par \par hypocitraturia\par kidney stone\par \par Plan:\par \par urocit K\par follow up in 3 months with urine recheck.

## 2022-04-06 NOTE — LETTER BODY
[Dear  ___] : Dear  [unfilled], [Courtesy Letter:] : I had the pleasure of seeing your patient, [unfilled], in my office today. [Please see my note below.] : Please see my note below. [Sincerely,] : Sincerely, [FreeTextEntry3] : Ed\par \par Aravind Whatley MD\par [facility]\par  of Urology\par [employer] at [facility]\par

## 2022-07-06 ENCOUNTER — APPOINTMENT (OUTPATIENT)
Dept: UROLOGY | Facility: CLINIC | Age: 57
End: 2022-07-06

## 2022-07-18 ENCOUNTER — RX RENEWAL (OUTPATIENT)
Age: 57
End: 2022-07-18

## 2022-07-18 RX ORDER — POTASSIUM CITRATE 10 MEQ/1
10 MEQ TABLET, EXTENDED RELEASE ORAL
Qty: 60 | Refills: 2 | Status: ACTIVE | COMMUNITY
Start: 2022-04-06 | End: 1900-01-01

## 2022-09-12 ENCOUNTER — APPOINTMENT (OUTPATIENT)
Dept: CARDIOLOGY | Facility: CLINIC | Age: 57
End: 2022-09-12

## 2023-09-19 NOTE — ED ADULT TRIAGE NOTE - AS HEIGHT TYPE
September 19, 2023       Jamie Walker MD  [address]  [address]  [place] [address]  Via Fax: [phone]      Patient: Sarah Hernandez   YOB: 1949   Date of Visit: 9/19/2023       Dear Dr. Walker:    Thank you for referring Sarah Hernandez to me for evaluation. Below are my notes for this visit with her.    If you have questions, please do not hesitate to call me. I look forward to following your patient along with you.      Sincerely,        Foster Augustin MD        CC: No Recipients  Foster Augustin MD  9/19/2023  4:25 PM  Signed    Advanced Heart Failure Clinic Note    Advanced Heart Failure Consult     Date of Visit:  9/19/2023  Patient Name:  Sarah Hernandez  Medical Record Number:  [id number]  YOB: 1949   Primary Physician:  Jamie Walker MD.   Referring Physician:  Dr. Walker    CHIEF COMPLAINT:  Follow up    SUBJECTIVE:    HISTORY OF PRESENT ILLNESS:  Sarah Hernandez is a 74 year old female who presents to the clinic with the following history:     VF arrest s/p single chamber ICD 2014  VF s/p appropriate shock 5/2017, and 8/2018  Chronic HFrEF due to NICM with improved LVEF  HTN  Hypothyroidism    The patient presents to establish care regarding management and evaluation of her cardiomyopathy.    She states she did not have any medical issues up until 2014. She was in [place] and she had a VF arrest. She was taken to the hospital where she underwent evaluation. She says she was told her VF was idiopathic and no source was identified. She had a single chamber ICD placed there. Her echo did show mild reduction in LVEF previously. It is unclear if she had reduction when she had VF. Since then she has had appropriate shocks for VF events in 2017 and 2018. She follows with EP at [place] but is interested in transferring care within our group. She was started on sotalol after her last event and she has been stable since. She also was recently started on entresto. Generally she says  she feels well. She has not had decompensated HF. She is not on any loop diuretics. She denies SOB but says if she exerts herself enough she can get dyspneic.     Since last visit:  She continues to tolerate maximal dose entresto. She is also on Jardiance. She continues to be active. She is not exercising. There are no complaints of SOB, RIVAS, orthopnea, PND, LE edema. She has not had any device shocks.    HEART FAILURE MEDICATIONS:   [] ACEi [] ARB [x] BB (beta blocker) [] CCB (calcium channel blocker)   [] Corlanor   [] DIG (digoxin) [] Diuretic  [x] Entresto [] Hydralazine [x] MRA [] Nitrate [x] SGLT-2-I      DEVICES:   [x] ICD (implantable cardioverter-defibrillator)  [] BIV (biventricular) - ICD [] PPM (permanent pacemaker) [] Life Vest  [] CardioMEMS    Frequency / Description:   []  YES    [x]  NO Angina:   []  YES    [x]  NO Claudication:   []  YES    [x]  NO Syncope:   []  YES    [x]  NO Orthopnea:   []  YES    [x]  NO PND (paroxysmal nocturnal dyspnea):   []  YES    [x]  NO Pedal edema:   []  YES    [x]  NO Abdomen Swelling:   []  YES    [x]  NO Early Satiety:   []  YES    [x]  NO Hospitalization:   []  YES    [x]  NO Emergency Room Visit:   []  YES    [x]  NO Weight gain:  []  YES    [x]  NO Other:    COMPLIANCE   Description   [x]  YES    []  NO Medication:   [x]  YES    []  NO Diet:    REVIEW OF SYSTEMS:  Review of Systems   Constitutional: Positive for weight gain (wt up to 2 lbs from LOV). Negative for malaise/fatigue and weight loss.   Cardiovascular: Positive for dyspnea on exertion. Negative for chest pain, leg swelling and palpitations.   Respiratory: Negative for shortness of breath and sleep disturbances due to breathing.    Musculoskeletal: Negative for muscle cramps and muscle weakness.   Gastrointestinal: Negative for bloating and constipation.   Genitourinary: Negative for frequency.   Neurological: Negative for dizziness, headaches, loss of balance, paresthesias and weakness.    Psychiatric/Behavioral: Negative for altered mental status and hallucinations. The patient does not have insomnia.      10 point review of systems was completed and is negative except as stated above    MEDICATIONS:  Outpatient Medications Marked as Taking for the 9/19/23 encounter (Office Visit) with Foster Augustin MD   Medication Sig Dispense Refill   • sotalol (BETAPACE) 120 MG tablet Take 1 tablet by mouth daily. 90 tablet 3   • metoPROLOL tartrate (LOPRESSOR) 25 MG tablet Take 1 tablet by mouth daily. 90 tablet 3   • empagliflozin (JARDIANCE) 10 MG tablet Take 10 mg by mouth daily.     • sacubitril-valsartan (ENTRESTO)  MG per tablet Take 1 tablet by mouth in the morning and 1 tablet in the evening. 180 tablet 3   • Multiple Vitamins-Minerals (OCUVITE EXTRA PO) every 24 hours.     • aspirin 81 MG chewable tablet every 24 hours.     • CALCIUM ASPARTATE PO Take 75 mg by mouth daily.     • Coenzyme Q10 100 MG Cap      • magnesium 250 MG tablet Take 250 mg by mouth daily.     • rosuvastatin (CRESTOR) 10 MG tablet Take 10 mg by mouth daily.     • spironolactone (ALDACTONE) 25 MG tablet 25 mg daily.     • levothyroxine 112 MCG tablet TAKE 1 TABLET BY MOUTH EVERY DAY IN THE MORNING ON AN EMPTY STOMACH 90 tablet 0       ALLERGIES:   ALLERGIES:   Allergen Reactions   • Lisinopril Cough   • Nsaids Other (See Comments)     Contraindicated due to V Fib history  Cannot take b/c of sotalol  Contraindicated due to V Fib history     • Penicillins Other (See Comments)   • Seasonal Cough     beta blockers  beta blockers  beta blockers  beta blockers         PAST HISTORY:  I have reviewed the past medical history, family history, social history, medications and allergies listed in the medical record as obtained by my nursing staff and support staff and agree with their documentation.    OBJECTIVE:  PHYSICAL EXAMINATION:  Vitals:    Visit Vitals  BP 94/57 (BP Location: LUE - Left upper extremity, Patient Position:  Sitting, Cuff Size: Regular)   Pulse 64   Resp 18   Ht 5' 4\" (1.626 m)   Wt 86 kg (189 lb 7.8 oz)   BMI 32.53 kg/m²      BMI (body mass index):  Body mass index is 32.53 kg/m².  Constitutional:  well developed 74 year old female in no acute distress.  Skin:  Warm, dry, intact, no lesions.  HEENT:  Normocephalic, atraumatic.  Oral mucous membranes moist, EOMs (extraocular movements) intact.  Neck:  Supple, trachea midline, RAP= 5 cm, negative HJR (hepatojugular reflux)  Cardiovascular:  Normal S1, S2.  regular rhythm. Murmur:  none.  negative S3.  Respiratory:  Anterior/posterior lung sounds Clear  to auscultation bilaterally.   Abdomen:  Soft, nontender, negative hepatomegaly and normal bowel sounds.  Musculoskeletal/Extremities:  CRT (Capillary refill time) <3, no clubbing, no cyanosis, no edema.  Neurological:  No focal neurological deficits and speech normal.  Sensation grossly intact.  Psychiatric:  Alert and oriented to person, place and time.    LABORATORY:  Lab Results   Component Value Date    POTASSIUM 4.4 09/11/2023    SODIUM 139 09/11/2023    CO2 31 09/11/2023    CHLORIDE 107 09/11/2023    BUN 20 09/11/2023    CREATININE 0.78 09/11/2023    GLUCOSE 105 (H) 09/11/2023    CALCIUM 9.1 09/11/2023    WBC 5.9 09/11/2023    RBC 4.53 09/11/2023    HCT 42.1 09/11/2023    HGB 13.3 09/11/2023     09/11/2023    TSH 1.870 09/11/2023    CHOLESTEROL 133 09/11/2023    HDL 44 (L) 09/11/2023    CHOHDL 3.0 09/11/2023    TRIGLYCERIDE 130 09/11/2023    CALCLDL 63 09/11/2023       DIAGNOSTICS:  The following diagnostics were reviewed.    Cardiac   TTE 5/24/23  1. Left ventricle: The cavity size is moderately dilated. Wall thickness is     normal. Systolic function is mildly reduced. The ejection fraction was     measured by biplane method of disks. Doppler parameters are consistent with     abnormal left ventricular relaxation (grade 1 diastolic dysfunction). The     ejection fraction is 46%.  2. Aortic valve: The  annulus is mildly calcified. The valve is trileaflet. The     leaflets are mildly thickened. There is mild to moderate regurgitation.  3. Mitral valve: There is mild regurgitation.  4. Left atrium: The atrium is mildly dilated.  5. Right ventricle: The cavity size is normal. Wall thickness is normal.     Systolic function is normal. Pacemaker lead noted in right ventricle.  6. Pulmonic valve: There is mild regurgitation.  7. Pericardium, extracardiac: A trivial pericardial effusion is identified.  IMPRESSIONS:  There is no previous report available for comparison at this  time.    TTE 1/2023 - DULY    1. Left ventricle: The cavity size is normal. Wall thickness is normal.      Systolic function is mildly to moderately reduced. The estimated ejection      fraction is 40%. Dyssynergic motion of the anteroseptal myocardium. Left      ventricular diastolic function parameters are normal.   2. Aortic valve: There is mild to moderate regurgitation.   3. Mitral valve: There is mild regurgitation.   4. Left atrium: The atrium is mildly to moderately dilated.     Impressions:     - Prior study date: 12/16/2021. Ejection fraction was 40-45%.   - Compared to the prior study, there has been no significant interval     change.      Coronary CTA 1/2022  1. There is nonobstructive (25-49% stenosis) coronary artery disease   involving the proximal/mid LAD and proximal LCx. There is nonobstructive   (<25% stenosis) coronary artery disease involving the mid RCA.   2. The coronary artery calcium score is 337 Agatston units.   3. The visualized thoracic aorta is normal in size.       TTE 5/2018 - DULY    1. Left ventricle: The cavity size is normal. Wall thickness is normal.      Systolic function is normal. The estimated ejection fraction is 50%. Wall      motion is normal; there are no regional wall motion abnormalities.      Doppler parameters are consistent with abnormal left ventricular      relaxation (grade 1 diastolic  dysfunction).   2. Ventricular septum: Septal motion shows abnormal function. These changes      are consistent with right ventricular pacing.   3. Aortic valve: There is mild regurgitation.   4. Mitral valve: There is mild to moderate regurgitation directed      eccentrically and posteriorly.       Non-Cardiac       ASSESSMENT/PLAN:  Chronic HFrEF due to NICM :  ACC/ AHA Stage C, New York Heart Association Classification:  NYHA Class II: Mild HF symptoms/activity intolerance (Able to do light yardwork, can walk normally on level ground)    Volume status is normal.   Perfusion status is normal.   Labs Reviewed.    -Unclear history surrounding her initial VF arrest in 2014  -She is s/p ICD and her device is unlikely MRI compatible - cardiac MRI would otherwise be a good option to assess for scar and etiology of VF  -Her recent echo shows LVEF 46% - recommend continuing GDMT  -She is on sotalol which can be negative inotropic - she seems to have normal perfusion at this time    Based on objective data and clinical data will augment medical therapy as follows:  OMT (Optimal medical therapy):  -Continue full dose entresto  -Continue Toprol   -Continue MRA  -Add SGLT-2-I  -Not on loop diuretics    ICD (Implantable cardioverter defibrillator):  -S/p single chamber ICD  -Now following with Dr. Cortez     Teaching:  Record weights on a daily basis.    Call with a weight gain greater than 3 pounds in one day or 5 pounds in one week.   Follow a 2 gram sodium (2000 mg)/2 liter (64 ounces) fluid restricted diet.   Avoid the use of NSAID (nonsteroidal anti-inflammatory drugs) including but not limited to Ibuprofen, Advil and Aleve.     Follow-up:  Clinic:  6 months  Testing:  Labs     Patient understands he/she can return sooner if any issues should arise.     Foster Augustin MD  Advanced Heart Failure and Pulmonary Hypertension  [facility]     stated

## 2024-05-27 NOTE — ED ADULT TRIAGE NOTE - NS ED NURSE DIRECT TO ROOM YN
"Jose Armando Richardson (Morgan) was seen and treated in our emergency department on 5/27/2024.  She may return to work on 05/29/2024.       If you have any questions or concerns, please don't hesitate to call.      Sheri Granger, JAI" Yes

## 2024-07-01 ENCOUNTER — APPOINTMENT (OUTPATIENT)
Age: 59
End: 2024-07-01
Payer: COMMERCIAL

## 2024-07-01 DIAGNOSIS — K09.0 DEVELOPMENTAL ODONTOGENIC CYSTS: ICD-10-CM

## 2024-07-01 PROCEDURE — 99204 OFFICE O/P NEW MOD 45 MIN: CPT

## 2024-07-06 ENCOUNTER — APPOINTMENT (OUTPATIENT)
Dept: CT IMAGING | Facility: CLINIC | Age: 59
End: 2024-07-06
Payer: COMMERCIAL

## 2024-07-06 ENCOUNTER — OUTPATIENT (OUTPATIENT)
Dept: OUTPATIENT SERVICES | Facility: HOSPITAL | Age: 59
LOS: 1 days | End: 2024-07-06
Payer: COMMERCIAL

## 2024-07-06 DIAGNOSIS — K09.0 DEVELOPMENTAL ODONTOGENIC CYSTS: ICD-10-CM

## 2024-07-06 DIAGNOSIS — Z00.8 ENCOUNTER FOR OTHER GENERAL EXAMINATION: ICD-10-CM

## 2024-07-06 DIAGNOSIS — Z98.891 HISTORY OF UTERINE SCAR FROM PREVIOUS SURGERY: Chronic | ICD-10-CM

## 2024-07-06 PROCEDURE — 70486 CT MAXILLOFACIAL W/O DYE: CPT

## 2024-07-06 PROCEDURE — 70486 CT MAXILLOFACIAL W/O DYE: CPT | Mod: 26

## 2024-07-31 ENCOUNTER — APPOINTMENT (OUTPATIENT)
Age: 59
End: 2024-07-31
Payer: COMMERCIAL

## 2024-07-31 PROCEDURE — 99024 POSTOP FOLLOW-UP VISIT: CPT

## 2025-01-22 ENCOUNTER — APPOINTMENT (OUTPATIENT)
Age: 60
End: 2025-01-22

## 2025-01-27 ENCOUNTER — APPOINTMENT (OUTPATIENT)
Dept: PULMONOLOGY | Facility: CLINIC | Age: 60
End: 2025-01-27

## 2025-02-25 ENCOUNTER — APPOINTMENT (OUTPATIENT)
Age: 60
End: 2025-02-25
Payer: COMMERCIAL

## 2025-02-25 PROCEDURE — 99213 OFFICE O/P EST LOW 20 MIN: CPT

## 2025-03-05 ENCOUNTER — APPOINTMENT (OUTPATIENT)
Dept: PULMONOLOGY | Facility: CLINIC | Age: 60
End: 2025-03-05
Payer: COMMERCIAL

## 2025-03-05 VITALS
SYSTOLIC BLOOD PRESSURE: 126 MMHG | RESPIRATION RATE: 16 BRPM | WEIGHT: 143 LBS | OXYGEN SATURATION: 96 % | HEIGHT: 61 IN | BODY MASS INDEX: 27 KG/M2 | DIASTOLIC BLOOD PRESSURE: 84 MMHG | HEART RATE: 74 BPM

## 2025-03-05 DIAGNOSIS — G47.33 OBSTRUCTIVE SLEEP APNEA (ADULT) (PEDIATRIC): ICD-10-CM

## 2025-03-05 PROCEDURE — 99213 OFFICE O/P EST LOW 20 MIN: CPT

## 2025-03-05 RX ORDER — ROSUVASTATIN CALCIUM 20 MG/1
20 TABLET, FILM COATED ORAL
Refills: 0 | Status: ACTIVE | COMMUNITY

## 2025-03-05 RX ORDER — METFORMIN HYDROCHLORIDE 500 MG/1
500 TABLET, COATED ORAL
Refills: 0 | Status: ACTIVE | COMMUNITY

## 2025-04-10 ENCOUNTER — OUTPATIENT (OUTPATIENT)
Dept: OUTPATIENT SERVICES | Facility: HOSPITAL | Age: 60
LOS: 1 days | End: 2025-04-10

## 2025-04-10 VITALS
TEMPERATURE: 98 F | WEIGHT: 143.08 LBS | SYSTOLIC BLOOD PRESSURE: 149 MMHG | HEART RATE: 60 BPM | DIASTOLIC BLOOD PRESSURE: 80 MMHG | OXYGEN SATURATION: 98 % | RESPIRATION RATE: 16 BRPM | HEIGHT: 61 IN

## 2025-04-10 DIAGNOSIS — D16.5 BENIGN NEOPLASM OF LOWER JAW BONE: ICD-10-CM

## 2025-04-10 DIAGNOSIS — G47.33 OBSTRUCTIVE SLEEP APNEA (ADULT) (PEDIATRIC): ICD-10-CM

## 2025-04-10 DIAGNOSIS — Z98.890 OTHER SPECIFIED POSTPROCEDURAL STATES: Chronic | ICD-10-CM

## 2025-04-10 DIAGNOSIS — I10 ESSENTIAL (PRIMARY) HYPERTENSION: ICD-10-CM

## 2025-04-10 DIAGNOSIS — Z98.891 HISTORY OF UTERINE SCAR FROM PREVIOUS SURGERY: Chronic | ICD-10-CM

## 2025-04-10 LAB
BASOPHILS # BLD AUTO: 0.08 K/UL — SIGNIFICANT CHANGE UP (ref 0–0.2)
BASOPHILS NFR BLD AUTO: 1.1 % — SIGNIFICANT CHANGE UP (ref 0–2)
BLD GP AB SCN SERPL QL: NEGATIVE — SIGNIFICANT CHANGE UP
EOSINOPHIL # BLD AUTO: 0.25 K/UL — SIGNIFICANT CHANGE UP (ref 0–0.5)
EOSINOPHIL NFR BLD AUTO: 3.4 % — SIGNIFICANT CHANGE UP (ref 0–6)
HCT VFR BLD CALC: 39.3 % — SIGNIFICANT CHANGE UP (ref 34.5–45)
HGB BLD-MCNC: 13.5 G/DL — SIGNIFICANT CHANGE UP (ref 11.5–15.5)
IMM GRANULOCYTES NFR BLD AUTO: 0.1 % — SIGNIFICANT CHANGE UP (ref 0–0.9)
LYMPHOCYTES # BLD AUTO: 2.33 K/UL — SIGNIFICANT CHANGE UP (ref 1–3.3)
LYMPHOCYTES # BLD AUTO: 32.1 % — SIGNIFICANT CHANGE UP (ref 13–44)
MCHC RBC-ENTMCNC: 28.7 PG — SIGNIFICANT CHANGE UP (ref 27–34)
MCHC RBC-ENTMCNC: 34.4 G/DL — SIGNIFICANT CHANGE UP (ref 32–36)
MCV RBC AUTO: 83.6 FL — SIGNIFICANT CHANGE UP (ref 80–100)
MONOCYTES # BLD AUTO: 0.56 K/UL — SIGNIFICANT CHANGE UP (ref 0–0.9)
MONOCYTES NFR BLD AUTO: 7.7 % — SIGNIFICANT CHANGE UP (ref 2–14)
NEUTROPHILS # BLD AUTO: 4.03 K/UL — SIGNIFICANT CHANGE UP (ref 1.8–7.4)
NEUTROPHILS NFR BLD AUTO: 55.6 % — SIGNIFICANT CHANGE UP (ref 43–77)
PLATELET # BLD AUTO: 255 K/UL — SIGNIFICANT CHANGE UP (ref 150–400)
RBC # BLD: 4.7 M/UL — SIGNIFICANT CHANGE UP (ref 3.8–5.2)
RBC # FLD: 13.2 % — SIGNIFICANT CHANGE UP (ref 10.3–14.5)
RH IG SCN BLD-IMP: POSITIVE — SIGNIFICANT CHANGE UP
RH IG SCN BLD-IMP: POSITIVE — SIGNIFICANT CHANGE UP
WBC # BLD: 7.26 K/UL — SIGNIFICANT CHANGE UP (ref 3.8–10.5)
WBC # FLD AUTO: 7.26 K/UL — SIGNIFICANT CHANGE UP (ref 3.8–10.5)

## 2025-04-10 RX ORDER — ROSUVASTATIN CALCIUM 5 MG/1
1 TABLET, FILM COATED ORAL
Refills: 0 | DISCHARGE

## 2025-04-10 RX ORDER — METFORMIN HYDROCHLORIDE 850 MG/1
1 TABLET ORAL
Refills: 0 | DISCHARGE

## 2025-04-10 NOTE — H&P PST ADULT - HISTORY OF PRESENT ILLNESS
61 y/o female PMH HTN HLD prediabetes AGUS on CPAP presents to presurgical testing with diagnosis of benign neoplasm of lower jaw bone. Pt is scheduled for excision of benign cyst of mandible extraction impacted tooth complete body #32.

## 2025-04-10 NOTE — H&P PST ADULT - PROBLEM SELECTOR PLAN 1
Patient tentatively scheduled for  excision of benign cyst of mandible extraction impacted tooth complete body #32 for 4/24/25. Pre-op instructions provided. Pt given verbal and written instructions with teach back on pepcid. Pt verbalized understanding with return demonstration.    PST CBC T&S done.

## 2025-04-10 NOTE — H&P PST ADULT - LAST ECHOCARDIOGRAM
Patient states she had a cardiac work up due to strong family history of heart disease, Pt denies symptoms. Echo (HIE) 10/2020- Normal global left ventricular systolic function, EF 60-65%,  Mild aortic regurgitation

## 2025-04-10 NOTE — H&P PST ADULT - NSICDXPASTMEDICALHX_GEN_ALL_CORE_FT
PAST MEDICAL HISTORY:  Benign neoplasm of lower jaw bone     Cervical herniated disc     HLD (hyperlipidemia)     HTN (hypertension)     Impacted tooth     Kidney stone     AGUS on CPAP     Prediabetes

## 2025-04-23 NOTE — ASU PATIENT PROFILE, ADULT - BLOOD AVOIDANCE/RESTRICTIONS, PROFILE
Flexeril as directed.  Please return if you get worse or if new problems develop Tylenol for pain. Please follow-up with the primary care doctor above this week.  Rest.  
none

## 2025-04-24 ENCOUNTER — APPOINTMENT (OUTPATIENT)
Age: 60
End: 2025-04-24

## 2025-04-24 ENCOUNTER — TRANSCRIPTION ENCOUNTER (OUTPATIENT)
Age: 60
End: 2025-04-24

## 2025-04-24 ENCOUNTER — RESULT REVIEW (OUTPATIENT)
Age: 60
End: 2025-04-24

## 2025-04-24 ENCOUNTER — OUTPATIENT (OUTPATIENT)
Dept: INPATIENT UNIT | Facility: HOSPITAL | Age: 60
LOS: 1 days | End: 2025-04-24
Payer: COMMERCIAL

## 2025-04-24 VITALS
DIASTOLIC BLOOD PRESSURE: 78 MMHG | OXYGEN SATURATION: 99 % | HEIGHT: 61 IN | RESPIRATION RATE: 14 BRPM | TEMPERATURE: 99 F | HEART RATE: 65 BPM | SYSTOLIC BLOOD PRESSURE: 142 MMHG | WEIGHT: 143.08 LBS

## 2025-04-24 VITALS
OXYGEN SATURATION: 97 % | SYSTOLIC BLOOD PRESSURE: 153 MMHG | HEART RATE: 64 BPM | RESPIRATION RATE: 16 BRPM | DIASTOLIC BLOOD PRESSURE: 79 MMHG

## 2025-04-24 DIAGNOSIS — Z98.891 HISTORY OF UTERINE SCAR FROM PREVIOUS SURGERY: Chronic | ICD-10-CM

## 2025-04-24 DIAGNOSIS — D16.5 BENIGN NEOPLASM OF LOWER JAW BONE: ICD-10-CM

## 2025-04-24 DIAGNOSIS — Z98.890 OTHER SPECIFIED POSTPROCEDURAL STATES: Chronic | ICD-10-CM

## 2025-04-24 LAB — GLUCOSE BLDC GLUCOMTR-MCNC: 92 MG/DL — SIGNIFICANT CHANGE UP (ref 70–99)

## 2025-04-24 PROCEDURE — 88305 TISSUE EXAM BY PATHOLOGIST: CPT | Mod: 26

## 2025-04-24 PROCEDURE — D7240: CPT

## 2025-04-24 PROCEDURE — 21046 REMOVE MANDIBLE CYST COMPLEX: CPT

## 2025-04-24 DEVICE — BONE WAX 2.5GM: Type: IMPLANTABLE DEVICE | Status: FUNCTIONAL

## 2025-04-24 DEVICE — SURGIFOAM 8 X 12.25CM X 2MM: Type: IMPLANTABLE DEVICE | Status: FUNCTIONAL

## 2025-04-24 RX ORDER — IBUPROFEN 200 MG
1 TABLET ORAL
Qty: 28 | Refills: 0
Start: 2025-04-24 | End: 2025-04-30

## 2025-04-24 RX ORDER — ACETAMINOPHEN 500 MG/5ML
2 LIQUID (ML) ORAL
Qty: 56 | Refills: 0
Start: 2025-04-24 | End: 2025-04-30

## 2025-04-24 RX ORDER — SODIUM CHLORIDE 9 G/1000ML
1000 INJECTION, SOLUTION INTRAVENOUS
Refills: 0 | Status: ACTIVE | OUTPATIENT
Start: 2025-04-24 | End: 2026-03-23

## 2025-04-24 RX ORDER — CLINDAMYCIN PHOSPHATE 150 MG/ML
1 VIAL (ML) INJECTION
Qty: 21 | Refills: 0
Start: 2025-04-24 | End: 2025-04-30

## 2025-04-24 NOTE — ASU DISCHARGE PLAN (ADULT/PEDIATRIC) - NS MD DC FALL RISK RISK
For information on Fall & Injury Prevention, visit: [email or web address] OR  [email or web address] OR  https://www.cdc.gov/steadi/patient.html

## 2025-04-24 NOTE — ASU DISCHARGE PLAN (ADULT/PEDIATRIC) - FOLLOW UP APPOINTMENTS
may also call Recovery Room (PACU) 24/7 @ [phone]/[facility]

## 2025-04-24 NOTE — ASU DISCHARGE PLAN (ADULT/PEDIATRIC) - ASU DC SPECIAL INSTRUCTIONSFT
PAIN: You may continue to take Acetaminophen (Tylenol) and Ibuprofen over the counter for pain. You can alternate the two medications, giving one every 3 hours. We recommend taking the medications around the clock for the first few days at home after surgery. Then you can start taking them only as needed for pain.   WOUND CARE:  You may brush your teeth, but be careful of the surgical area and stitches nearby. Use prescription chlorhexidine mouth rinse twice a day.  ACTIVITY: No heavy lifting, straining, or vigorous activity until your follow-up appointment in 1 week.   NOTIFY US IF: There is any bleeding that does not stop, any pus draining from wound(s), any fever (over 100.5 F) or chills, persistent nausea/vomiting, persistent diarrhea, or if pain is not controlled on their discharge pain medications.  FOLLOW-UP: Please call the office and make an appointment to follow up with Dr Duffy in 1 week.

## 2025-04-24 NOTE — ASU DISCHARGE PLAN (ADULT/PEDIATRIC) - FINANCIAL ASSISTANCE
[facility] provides services at a reduced cost to those who are determined to be eligible through [facility]’s financial assistance program. Information regarding [facility]’s financial assistance program can be found by going to [email or web address] or by calling [phone].

## 2025-04-24 NOTE — ASU DISCHARGE PLAN (ADULT/PEDIATRIC) - NURSING INSTRUCTIONS
Follow up with MD as recommended. Continue all meds as prescribed. You received IV Toradol for pain management. Please DO NOT take Motrin/Ibuprofen/Advil/Aleve/NSAIDs (Non-Steroidal Anti-Inflammatory Drugs) for the next 6 hours (until 12:10AM). You received IV Tylenol for pain management. Please DO NOT take any Tylenol (Acetaminophen) containing products, such as Vicodin, Percocet, Excedrin, and cold medications for the next 6 hours (until 11:20 PM). DO NOT TAKE MORE THAN 4000 MG OF TYLENOL in a 24 hour period.

## 2025-04-28 LAB — SURGICAL PATHOLOGY STUDY: SIGNIFICANT CHANGE UP

## 2025-04-30 ENCOUNTER — APPOINTMENT (OUTPATIENT)
Age: 60
End: 2025-04-30
Payer: COMMERCIAL

## 2025-04-30 PROBLEM — E78.5 HYPERLIPIDEMIA, UNSPECIFIED: Chronic | Status: ACTIVE | Noted: 2025-04-10

## 2025-04-30 PROBLEM — D16.5 BENIGN NEOPLASM OF LOWER JAW BONE: Chronic | Status: ACTIVE | Noted: 2025-04-10

## 2025-04-30 PROBLEM — G47.33 OBSTRUCTIVE SLEEP APNEA (ADULT) (PEDIATRIC): Chronic | Status: ACTIVE | Noted: 2025-04-10

## 2025-04-30 PROBLEM — N20.0 CALCULUS OF KIDNEY: Chronic | Status: ACTIVE | Noted: 2025-04-10

## 2025-04-30 PROBLEM — K01.1 IMPACTED TEETH: Chronic | Status: ACTIVE | Noted: 2025-04-10

## 2025-04-30 PROBLEM — M50.20 OTHER CERVICAL DISC DISPLACEMENT, UNSPECIFIED CERVICAL REGION: Chronic | Status: ACTIVE | Noted: 2025-04-10

## 2025-04-30 PROCEDURE — 99024 POSTOP FOLLOW-UP VISIT: CPT

## 2025-05-28 ENCOUNTER — APPOINTMENT (OUTPATIENT)
Age: 60
End: 2025-05-28
Payer: COMMERCIAL

## 2025-05-28 PROCEDURE — 99024 POSTOP FOLLOW-UP VISIT: CPT

## 2025-06-04 ENCOUNTER — APPOINTMENT (OUTPATIENT)
Dept: PULMONOLOGY | Facility: CLINIC | Age: 60
End: 2025-06-04
Payer: COMMERCIAL

## 2025-06-04 VITALS
HEART RATE: 92 BPM | BODY MASS INDEX: 25.86 KG/M2 | RESPIRATION RATE: 16 BRPM | OXYGEN SATURATION: 98 % | DIASTOLIC BLOOD PRESSURE: 67 MMHG | HEIGHT: 61 IN | SYSTOLIC BLOOD PRESSURE: 118 MMHG | WEIGHT: 137 LBS

## 2025-06-04 DIAGNOSIS — G47.33 OBSTRUCTIVE SLEEP APNEA (ADULT) (PEDIATRIC): ICD-10-CM

## 2025-06-04 PROCEDURE — 99213 OFFICE O/P EST LOW 20 MIN: CPT

## 2025-06-04 PROCEDURE — G2211 COMPLEX E/M VISIT ADD ON: CPT

## 2025-06-25 ENCOUNTER — APPOINTMENT (OUTPATIENT)
Age: 60
End: 2025-06-25
Payer: COMMERCIAL

## 2025-06-25 PROCEDURE — 99024 POSTOP FOLLOW-UP VISIT: CPT

## 2025-07-29 NOTE — H&P PST ADULT - WEIGHT IN LBS
What Type Of Note Output Would You Prefer (Optional)?: Standard Output Hpi Title: Evaluation of Skin Lesions How Severe Are Your Spot(S)?: moderate Have Your Spot(S) Been Treated In The Past?: has not been treated 143

## (undated) DEVICE — BUR STRYKER MICRO ROUND SS 2.4 X 54.5MM STERILE

## (undated) DEVICE — WARMING BLANKET LOWER ADULT

## (undated) DEVICE — POSITIONER FOAM EGG CRATE ULNAR 2PCS (PINK)

## (undated) DEVICE — SUT VICRYL 4-0 27" RB-1 UNDYED

## (undated) DEVICE — VENODYNE/SCD SLEEVE CALF MEDIUM

## (undated) DEVICE — LABELS BLANK W PEN

## (undated) DEVICE — NDL HYPO REGULAR BEVEL 25G X 1.5" (BLUE)

## (undated) DEVICE — PREP BETADINE KIT

## (undated) DEVICE — PACK DENTAL MINOR

## (undated) DEVICE — SUT CHROMIC 4-0 27" RB-1

## (undated) DEVICE — SOL IRR POUR NS 0.9% 500ML

## (undated) DEVICE — SUT CHROMIC 3-0 27" RB-1

## (undated) DEVICE — SYR LUER LOK 10CC

## (undated) DEVICE — DRAPE TOWEL BLUE 17" X 24"

## (undated) DEVICE — DRAPE 3/4 SHEET 52X76"

## (undated) DEVICE — ELCTR GROUNDING PAD ADULT COVIDIEN